# Patient Record
Sex: MALE | Race: WHITE | Employment: OTHER | URBAN - METROPOLITAN AREA
[De-identification: names, ages, dates, MRNs, and addresses within clinical notes are randomized per-mention and may not be internally consistent; named-entity substitution may affect disease eponyms.]

---

## 2018-01-01 ENCOUNTER — ANESTHESIA EVENT (OUTPATIENT)
Dept: SURGERY | Facility: HOSPITAL | Age: 78
End: 2018-01-01

## 2018-01-01 ENCOUNTER — APPOINTMENT (OUTPATIENT)
Dept: MRI IMAGING | Facility: HOSPITAL | Age: 78
DRG: 853 | End: 2018-01-01
Attending: Other
Payer: MEDICARE

## 2018-01-01 ENCOUNTER — APPOINTMENT (OUTPATIENT)
Dept: CT IMAGING | Facility: HOSPITAL | Age: 78
DRG: 853 | End: 2018-01-01
Attending: NURSE PRACTITIONER
Payer: MEDICARE

## 2018-01-01 ENCOUNTER — APPOINTMENT (OUTPATIENT)
Dept: GENERAL RADIOLOGY | Facility: HOSPITAL | Age: 78
DRG: 853 | End: 2018-01-01
Attending: EMERGENCY MEDICINE
Payer: MEDICARE

## 2018-01-01 ENCOUNTER — APPOINTMENT (OUTPATIENT)
Dept: GENERAL RADIOLOGY | Facility: HOSPITAL | Age: 78
DRG: 853 | End: 2018-01-01
Attending: UROLOGY
Payer: MEDICARE

## 2018-01-01 ENCOUNTER — APPOINTMENT (OUTPATIENT)
Dept: ULTRASOUND IMAGING | Facility: HOSPITAL | Age: 78
DRG: 853 | End: 2018-01-01
Attending: HOSPITALIST
Payer: MEDICARE

## 2018-01-01 ENCOUNTER — APPOINTMENT (OUTPATIENT)
Dept: CT IMAGING | Facility: HOSPITAL | Age: 78
DRG: 853 | End: 2018-01-01
Attending: EMERGENCY MEDICINE
Payer: MEDICARE

## 2018-01-01 ENCOUNTER — APPOINTMENT (OUTPATIENT)
Dept: ULTRASOUND IMAGING | Facility: HOSPITAL | Age: 78
DRG: 853 | End: 2018-01-01
Attending: INTERNAL MEDICINE
Payer: MEDICARE

## 2018-01-01 ENCOUNTER — ANESTHESIA (OUTPATIENT)
Dept: SURGERY | Facility: HOSPITAL | Age: 78
End: 2018-01-01

## 2018-01-01 ENCOUNTER — APPOINTMENT (OUTPATIENT)
Dept: CT IMAGING | Facility: HOSPITAL | Age: 78
DRG: 853 | End: 2018-01-01
Attending: INTERNAL MEDICINE
Payer: MEDICARE

## 2018-01-01 ENCOUNTER — APPOINTMENT (OUTPATIENT)
Dept: GENERAL RADIOLOGY | Facility: HOSPITAL | Age: 78
DRG: 853 | End: 2018-01-01
Attending: INTERNAL MEDICINE
Payer: MEDICARE

## 2018-01-01 ENCOUNTER — HOSPITAL ENCOUNTER (INPATIENT)
Facility: HOSPITAL | Age: 78
LOS: 5 days | DRG: 853 | End: 2018-01-01
Attending: EMERGENCY MEDICINE | Admitting: HOSPITALIST
Payer: MEDICARE

## 2018-01-01 ENCOUNTER — APPOINTMENT (OUTPATIENT)
Dept: CV DIAGNOSTICS | Facility: HOSPITAL | Age: 78
DRG: 853 | End: 2018-01-01
Attending: INTERNAL MEDICINE
Payer: MEDICARE

## 2018-01-01 VITALS
RESPIRATION RATE: 16 BRPM | WEIGHT: 202.63 LBS | HEART RATE: 86 BPM | OXYGEN SATURATION: 96 % | SYSTOLIC BLOOD PRESSURE: 87 MMHG | BODY MASS INDEX: 29.01 KG/M2 | HEIGHT: 70 IN | DIASTOLIC BLOOD PRESSURE: 56 MMHG | TEMPERATURE: 98 F

## 2018-01-01 DIAGNOSIS — I48.91 ATRIAL FIBRILLATION WITH RAPID VENTRICULAR RESPONSE (HCC): ICD-10-CM

## 2018-01-01 DIAGNOSIS — Z79.01 ANTICOAGULATED WITH WARFARIN: ICD-10-CM

## 2018-01-01 DIAGNOSIS — N30.00 ACUTE CYSTITIS WITHOUT HEMATURIA: Primary | ICD-10-CM

## 2018-01-01 DIAGNOSIS — R79.1 ELEVATED INR: ICD-10-CM

## 2018-01-01 DIAGNOSIS — R79.89 ELEVATED LACTIC ACID LEVEL: ICD-10-CM

## 2018-01-01 DIAGNOSIS — A41.9 SEPSIS, DUE TO UNSPECIFIED ORGANISM: ICD-10-CM

## 2018-01-01 PROCEDURE — 74230 X-RAY XM SWLNG FUNCJ C+: CPT | Performed by: UROLOGY

## 2018-01-01 PROCEDURE — 30283B1 TRANSFUSION OF NONAUTOLOGOUS 4-FACTOR PROTHROMBIN COMPLEX CONCENTRATE INTO VEIN, PERCUTANEOUS APPROACH: ICD-10-PCS | Performed by: EMERGENCY MEDICINE

## 2018-01-01 PROCEDURE — 74019 RADEX ABDOMEN 2 VIEWS: CPT | Performed by: INTERNAL MEDICINE

## 2018-01-01 PROCEDURE — 74176 CT ABD & PELVIS W/O CONTRAST: CPT | Performed by: INTERNAL MEDICINE

## 2018-01-01 PROCEDURE — 0T778DZ DILATION OF LEFT URETER WITH INTRALUMINAL DEVICE, VIA NATURAL OR ARTIFICIAL OPENING ENDOSCOPIC: ICD-10-PCS | Performed by: UROLOGY

## 2018-01-01 PROCEDURE — 76700 US EXAM ABDOM COMPLETE: CPT | Performed by: INTERNAL MEDICINE

## 2018-01-01 PROCEDURE — 93306 TTE W/DOPPLER COMPLETE: CPT | Performed by: INTERNAL MEDICINE

## 2018-01-01 PROCEDURE — 30233K1 TRANSFUSION OF NONAUTOLOGOUS FROZEN PLASMA INTO PERIPHERAL VEIN, PERCUTANEOUS APPROACH: ICD-10-PCS | Performed by: EMERGENCY MEDICINE

## 2018-01-01 PROCEDURE — 99232 SBSQ HOSP IP/OBS MODERATE 35: CPT | Performed by: HOSPITALIST

## 2018-01-01 PROCEDURE — 99233 SBSQ HOSP IP/OBS HIGH 50: CPT | Performed by: HOSPITALIST

## 2018-01-01 PROCEDURE — 99222 1ST HOSP IP/OBS MODERATE 55: CPT | Performed by: NURSE PRACTITIONER

## 2018-01-01 PROCEDURE — 74420 UROGRAPHY RTRGR +-KUB: CPT | Performed by: UROLOGY

## 2018-01-01 PROCEDURE — 99223 1ST HOSP IP/OBS HIGH 75: CPT | Performed by: HOSPITALIST

## 2018-01-01 PROCEDURE — 99232 SBSQ HOSP IP/OBS MODERATE 35: CPT | Performed by: INTERNAL MEDICINE

## 2018-01-01 PROCEDURE — 72146 MRI CHEST SPINE W/O DYE: CPT | Performed by: OTHER

## 2018-01-01 PROCEDURE — 99221 1ST HOSP IP/OBS SF/LOW 40: CPT | Performed by: INTERNAL MEDICINE

## 2018-01-01 PROCEDURE — 70450 CT HEAD/BRAIN W/O DYE: CPT | Performed by: EMERGENCY MEDICINE

## 2018-01-01 PROCEDURE — BT1F0ZZ FLUOROSCOPY OF LEFT KIDNEY, URETER AND BLADDER USING HIGH OSMOLAR CONTRAST: ICD-10-PCS | Performed by: UROLOGY

## 2018-01-01 PROCEDURE — 72148 MRI LUMBAR SPINE W/O DYE: CPT | Performed by: OTHER

## 2018-01-01 PROCEDURE — 99232 SBSQ HOSP IP/OBS MODERATE 35: CPT | Performed by: NURSE PRACTITIONER

## 2018-01-01 PROCEDURE — 71045 X-RAY EXAM CHEST 1 VIEW: CPT | Performed by: EMERGENCY MEDICINE

## 2018-01-01 PROCEDURE — 70450 CT HEAD/BRAIN W/O DYE: CPT | Performed by: NURSE PRACTITIONER

## 2018-01-01 DEVICE — STENT URET 6F 28CM ULSMTH: Type: IMPLANTABLE DEVICE | Site: URETER | Status: FUNCTIONAL

## 2018-01-01 RX ORDER — DEXTROSE AND SODIUM CHLORIDE 5; .9 G/100ML; G/100ML
INJECTION, SOLUTION INTRAVENOUS CONTINUOUS
Status: DISCONTINUED | OUTPATIENT
Start: 2018-01-01 | End: 2018-01-01

## 2018-01-01 RX ORDER — SODIUM CHLORIDE 9 MG/ML
INJECTION, SOLUTION INTRAVENOUS ONCE
Status: COMPLETED | OUTPATIENT
Start: 2018-01-01 | End: 2018-01-01

## 2018-01-01 RX ORDER — ASPIRIN 81 MG
1 TABLET, DELAYED RELEASE (ENTERIC COATED) ORAL NIGHTLY
COMMUNITY

## 2018-01-01 RX ORDER — LIDOCAINE HYDROCHLORIDE 20 MG/ML
10 JELLY TOPICAL ONCE
Status: COMPLETED | OUTPATIENT
Start: 2018-01-01 | End: 2018-01-01

## 2018-01-01 RX ORDER — SODIUM CHLORIDE 9 MG/ML
INJECTION, SOLUTION INTRAVENOUS ONCE
Status: DISCONTINUED | OUTPATIENT
Start: 2018-01-01 | End: 2018-01-01

## 2018-01-01 RX ORDER — LORAZEPAM 2 MG/ML
1 INJECTION INTRAMUSCULAR EVERY 10 MIN PRN
Status: DISCONTINUED | OUTPATIENT
Start: 2018-01-01 | End: 2018-01-01

## 2018-01-01 RX ORDER — LEVOTHYROXINE SODIUM 175 UG/1
175 TABLET ORAL
COMMUNITY

## 2018-01-01 RX ORDER — ATORVASTATIN CALCIUM 80 MG/1
80 TABLET, FILM COATED ORAL NIGHTLY
Status: DISCONTINUED | OUTPATIENT
Start: 2018-01-01 | End: 2018-01-01

## 2018-01-01 RX ORDER — WARFARIN SODIUM 3 MG/1
3 TABLET ORAL SEE ADMIN INSTRUCTIONS
COMMUNITY

## 2018-01-01 RX ORDER — ASPIRIN 81 MG/1
81 TABLET ORAL DAILY
Status: DISCONTINUED | OUTPATIENT
Start: 2018-01-01 | End: 2018-01-01

## 2018-01-01 RX ORDER — SODIUM CHLORIDE 9 MG/ML
INJECTION, SOLUTION INTRAVENOUS CONTINUOUS
Status: DISCONTINUED | OUTPATIENT
Start: 2018-01-01 | End: 2018-01-01

## 2018-01-01 RX ORDER — MORPHINE SULFATE 4 MG/ML
2 INJECTION, SOLUTION INTRAMUSCULAR; INTRAVENOUS EVERY 10 MIN PRN
Status: DISCONTINUED | OUTPATIENT
Start: 2018-01-01 | End: 2018-01-01

## 2018-01-01 RX ORDER — MORPHINE SULFATE 4 MG/ML
4 INJECTION, SOLUTION INTRAMUSCULAR; INTRAVENOUS EVERY 10 MIN PRN
Status: DISCONTINUED | OUTPATIENT
Start: 2018-01-01 | End: 2018-01-01

## 2018-01-01 RX ORDER — SODIUM CHLORIDE 450 MG/100ML
INJECTION, SOLUTION INTRAVENOUS CONTINUOUS
Status: DISCONTINUED | OUTPATIENT
Start: 2018-01-01 | End: 2018-01-01

## 2018-01-01 RX ORDER — LORAZEPAM 2 MG/ML
0.5 INJECTION INTRAMUSCULAR EVERY 10 MIN PRN
Status: DISCONTINUED | OUTPATIENT
Start: 2018-01-01 | End: 2018-01-01

## 2018-01-01 RX ORDER — ACETAMINOPHEN 500 MG
1000 TABLET ORAL ONCE
Status: COMPLETED | OUTPATIENT
Start: 2018-01-01 | End: 2018-01-01

## 2018-01-01 RX ORDER — MELATONIN
325 DAILY
Status: DISCONTINUED | OUTPATIENT
Start: 2018-01-01 | End: 2018-01-01

## 2018-01-01 RX ORDER — POTASSIUM CHLORIDE 14.9 MG/ML
20 INJECTION INTRAVENOUS ONCE
Status: COMPLETED | OUTPATIENT
Start: 2018-01-01 | End: 2018-01-01

## 2018-01-01 RX ORDER — MAGNESIUM SULFATE HEPTAHYDRATE 40 MG/ML
2 INJECTION, SOLUTION INTRAVENOUS ONCE
Status: COMPLETED | OUTPATIENT
Start: 2018-01-01 | End: 2018-01-01

## 2018-01-01 RX ORDER — SODIUM CHLORIDE 9 MG/ML
125 INJECTION, SOLUTION INTRAVENOUS CONTINUOUS
Status: DISCONTINUED | OUTPATIENT
Start: 2018-01-01 | End: 2018-01-01

## 2018-01-01 RX ORDER — DOCUSATE SODIUM 100 MG/1
100 CAPSULE, LIQUID FILLED ORAL 2 TIMES DAILY
Status: DISCONTINUED | OUTPATIENT
Start: 2018-01-01 | End: 2018-01-01

## 2018-01-01 RX ORDER — LEVETIRACETAM 250 MG/1
250 TABLET ORAL 2 TIMES DAILY
Status: DISCONTINUED | OUTPATIENT
Start: 2018-01-01 | End: 2018-01-01

## 2018-01-01 RX ORDER — GLYCOPYRROLATE 0.2 MG/ML
0.8 INJECTION, SOLUTION INTRAMUSCULAR; INTRAVENOUS EVERY 4 HOURS PRN
Status: DISCONTINUED | OUTPATIENT
Start: 2018-01-01 | End: 2018-01-01

## 2018-01-01 RX ORDER — POLYVINYL ALCOHOL 14 MG/ML
1 SOLUTION/ DROPS OPHTHALMIC 4 TIMES DAILY PRN
Status: DISCONTINUED | OUTPATIENT
Start: 2018-01-01 | End: 2018-01-01

## 2018-01-01 RX ORDER — METOPROLOL TARTRATE 5 MG/5ML
5 INJECTION INTRAVENOUS ONCE
Status: COMPLETED | OUTPATIENT
Start: 2018-01-01 | End: 2018-01-01

## 2018-01-01 RX ORDER — WARFARIN SODIUM 3 MG/1
4.5 TABLET ORAL SEE ADMIN INSTRUCTIONS
COMMUNITY

## 2018-01-01 RX ORDER — LIDOCAINE HYDROCHLORIDE 20 MG/ML
JELLY TOPICAL AS NEEDED
Status: DISCONTINUED | OUTPATIENT
Start: 2018-01-01 | End: 2018-01-01 | Stop reason: HOSPADM

## 2018-01-01 RX ORDER — DEXTROSE MONOHYDRATE 25 G/50ML
INJECTION, SOLUTION INTRAVENOUS
Status: COMPLETED
Start: 2018-01-01 | End: 2018-01-01

## 2018-10-10 PROBLEM — I48.91 ATRIAL FIBRILLATION WITH RAPID VENTRICULAR RESPONSE (HCC): Status: ACTIVE | Noted: 2018-01-01

## 2018-10-10 PROBLEM — E87.2 METABOLIC ACIDOSIS: Status: ACTIVE | Noted: 2018-01-01

## 2018-10-10 PROBLEM — R79.89 AZOTEMIA: Status: ACTIVE | Noted: 2018-01-01

## 2018-10-10 PROBLEM — N30.00 ACUTE CYSTITIS WITHOUT HEMATURIA: Status: ACTIVE | Noted: 2018-01-01

## 2018-10-10 PROBLEM — Z79.01 ANTICOAGULATED WITH WARFARIN: Status: ACTIVE | Noted: 2018-01-01

## 2018-10-10 PROBLEM — A41.9 SEPSIS, DUE TO UNSPECIFIED ORGANISM: Status: ACTIVE | Noted: 2018-01-01

## 2018-10-10 PROBLEM — R79.89 ELEVATED LACTIC ACID LEVEL: Status: ACTIVE | Noted: 2018-01-01

## 2018-10-10 PROBLEM — R79.1 ELEVATED INR: Status: ACTIVE | Noted: 2018-01-01

## 2018-10-10 NOTE — PROGRESS NOTES
Notified by patient's RN that patient's head CT was completed and showed the following results:    CONCLUSION:       1.   There are new multi focal regions of small extra-axial pockets of hemorrhage that measure up to 5 mm in thickness surrounding the high

## 2018-10-10 NOTE — CONSULTS
[address] Cardiology Consultation    Corinazainab Andersen Patient Status:  Emergency    1940 MRN [id number]   Location [address] Attending Natty Krishnamurthy MD   [address] Day # 0 PCP PHYSICIAN NONSTAFF     Reason for Consultation:  Mariana Holguin Peripheral vascular disease (HCC)    • Pneumonia, organism unspecified(486)    • Renal disorder     ckd stage 2   • Seizure disorder (HCC)    • Stroke [facility])    • Unspecified atrial fibrillation (HCC)    • Unspecified essential hypertension    • Unspecified Extremities: No edema  Neurologic: no focal deficits  Skin: Warm and dry. Telemetry: Afib    Laboratories and Data:  Diagnostics:    EKG, 10/10/2018: Afib, tachy.  RBBB    CXR, 10/10/2018:  reviewd    Labs:   HEM:  Recent Labs   Lab  10/10/18

## 2018-10-10 NOTE — CONSULTS
[facility]  Neurosurgery Consult    [address] Patient Status:  Inpatient    1940 MRN [id number]   [facility] 4SW-A Attending Ramon Medellin MD   [facility] Day # 0 PCP PHYSICIAN NONSTAFF     REASON FOR CONSULTATION:  Duy Quigley healing    • Other and unspecified hyperlipidemia    • Peripheral vascular disease ([facility]    • Pneumonia, organism unspecified(486)    • Renal disorder     ckd stage 2   • Seizure disorder (HCC)    • Stroke [facility])    • Unspecified atrial fibrillation ([facility] Oral Cap Take 100 mg by mouth 2 (two) times daily. Disp: 60 capsule Rfl: 0 10/9/2018 at Unknown time   levETIRAcetam 250 MG Oral Tab Take 250 mg by mouth 2 (two) times daily.  Disp:  Rfl:  10/9/2018 at Unknown time   Ferrous Sulfate 325 (65 FE) MG Oral Tab and orientated x 3. Speech fluent. Comprehension intact. PERRLA +2 brisk,  EOMI. Face is symmetrical. CN's GI. Sensation to light touch is intact bilaterally. Motor: No Drift. RUE, RLE PF/DF 5/5, LUE 4+/5.  LLE PF/DF 5/5, iliopsoas/quads 4/5 bilateral loss are again noted.     ASSESSMENT:  This is an [de-identified]year old male w/ extra-axial intracranial hemorrhage  Coagulopathy  Urosepsis  Transaminitis    Plan:  No surgical intervention at this time  Hold all anticoagulation until cleared by neurosurgery  [facility]

## 2018-10-10 NOTE — ED PROVIDER NOTES
Patient Seen in: [facility] Emergency Department    History   Patient presents with:  Rapid Heart Beat    Stated Complaint: rapid hr, fever, uti    HPI    Patient presents to the emergency department.   He is brought in by EMS for UTI as directed by blood pressure    • High cholesterol    • History of blood transfusion    • HYPERTHYROIDISM    • Long term (current) use of anticoagulants    • Muscle weakness    • Neuropathy    • Nondisplaced oblique fracture of shaft of humerus, right arm, subsequent en 103   Temp 98.3 °F (36.8 °C) (Temporal)   Resp 22   Ht 177.8 cm (5' 10\")   Wt 91.9 kg   SpO2 98%   BMI 29.07 kg/m²         Physical Exam    Chronically ill-appearing [de-identified]year-old lying on an emergency department bed he has very dry mucosa and poor dentitio components:    Pro-Beta Natriuretic Peptide 11,563 (*)     All other components within normal limits   PROTHROMBIN TIME (PT) - Abnormal; Notable for the following components:    PT 94.0 (*)     INR 11.88 (*)     All other components within normal limits Notable for the following components:    PT 17.3 (*)     INR 1.36 (*)     All other components within normal limits   BLOOD CULTURE - Abnormal; Notable for the following components:    Blood Culture Smear Gram Negative Rods (*)     Blood Culture Smear Ente following orders were created for panel order CBC WITH DIFFERENTIAL WITH PLATELET.   Procedure                               Abnormality         Status                     ---------                               -----------         ------ Impression     CONCLUSION:  Mild asymmetric increased opacity of the right chest may represent developing pneumonia.  Followup is recommended to ensure resolution.              Dictated by: Abhishek Galicia MD on 10/10/2018 at 10:34       Approved by: Aneesh Balderrama acid level    Disposition:  Admit  10/10/2018 10:38 am    Follow-up:  No follow-up provider specified.       Medications Prescribed:  Current Discharge Medication List        Present on Admission  Date Reviewed: 9/15/2016          ICD-10-CM Noted POA    * (

## 2018-10-10 NOTE — CONSULTS
Neurology consult NOTE    The reason for consultation:  Legs weakness.    HPI:   Robert Lanza is a [de-identified]year old male with medical history of paroxysmal atrial fibrillation, hypothyroidism, seizure disorder, essential hypertension, and coronary artery d Performed by Mati Downs MD at [facility] CVOR   • CABG     • CABG, ARTERIAL, SINGLE     • FRACTURE SURGERY  December 1, 2016    hip, left   • HIP PINNING Left 12/1/2016    Performed by June Rolle MD at [facility] MAIN OR   • OTHER      hip fracture surgery   • O Rfl:    aspirin 81 MG Oral Tab EC Take 1 tablet (81 mg total) by mouth daily. Disp: 30 tablet Rfl: 6   docusate sodium 100 MG Oral Cap Take 100 mg by mouth 2 (two) times daily.  Disp: 60 capsule Rfl: 0   levETIRAcetam 250 MG Oral Tab Take 250 mg by mouth 2 to light bilaterally.       EOM/lids NL     Facial sensation/Corneal: NL     Face (motor) NL     Hearing: NL     Palate: NL     SCM/Trapezius: 5/5     Tongue in the middle    MOTOR FUNCTION:     Tone: NL     Bulk: NL     Strength: upper 5/5, lower proximal thickness surrounding the high convexity right cerebral hemisphere mostly along the right frontal lobe. These are not causing significant mass effect. 2.  Greater than age appropriate parenchymal volume loss is again noted.   3.  Numerous chronic infarcts weakness can be due to the deconditioning. PLAN:     T- and L-spine MRI w/o contrast  Check CPK, ESR, CRp, TSH. Thank you for letting me participate in  care. If you have further questions, please feel free to contact me.     Sincerely,      MIRI

## 2018-10-10 NOTE — H&P
[facility]  History and Physical     Javiameya Mack Patient Status:  Inpatient    1940 MRN [id number]   [facility] 4SW-A Attending Devon Rai MD   Hosp Day # 0 PCP PHYSICIAN NONSTAFF     Chief Complaint: sepsis    Hi • Nondisplaced oblique fracture of shaft of humerus, right arm, subsequent encounter for fracture with routine healing    • Other and unspecified hyperlipidemia    • Peripheral vascular disease ([facility]    • Pneumonia, organism unspecified(486)    • Renal d mouth daily. Disp: 30 tablet Rfl: 6   docusate sodium 100 MG Oral Cap Take 100 mg by mouth 2 (two) times daily. Disp: 60 capsule Rfl: 0   levETIRAcetam 250 MG Oral Tab Take 250 mg by mouth 2 (two) times daily.  Disp:  Rfl:    Ferrous Sulfate 325 (65 FE) MG Labs   Lab  10/10/18   0934   GLU  118*   BUN  45*   CREATSERUM  2.11*   GFRAA  33*   GFRNAA  29*   CA  9.0   ALB  2.2*   NA  138   K  3.6   CL  105   CO2  19.0*   ALKPHO  394*   AST  600*   ALT  395*   BILT  1.6   TP  7.9       Estimated Creatinine Cleara

## 2018-10-10 NOTE — PLAN OF CARE
Received pt from ER nurse alert and oriented on 4L NC with sepsis bolus infusing. Pt has Afib RVR; on cardizem drip. Seen by pulmonary, critical care and APN. Hospitalist paged.

## 2018-10-10 NOTE — PROGRESS NOTES
[facility]      Sepsis Reassessment Note    /76   Pulse (!) 127   Temp 98.5 °F (36.9 °C)   Resp (!) 28   Ht 182.9 cm (6')   Wt 196 lb 3.4 oz (89 kg)   SpO2 96%   BMI 26.61 kg/m²      12:01 PM    Cardiac:  Regularity: Irregular  Rate: Tachycard

## 2018-10-10 NOTE — CONSULTS
Pulmonary / Critical Care H&P/Consult       NAME: Yessi Ybarra - ROOM: B3/B3 - MRN: [id number] - Age: [de-identified]year old - :  1940    Date of Admission: 10/10/2018  9:22 AM  Admission Diagnosis: No admission diagnoses are documented for this encounte atrial fibrillation ([facility]    • Unspecified essential hypertension    • Unspecified hypothyroidism       Past Surgical History:   Procedure Laterality Date   • ABDOMINAL AORTIC ANEURYSM ENDOSCOPIC N/A 11/17/2015    Performed by Lamont Silveira MD at [address] [place] [address] CVO Thursday, Friday, Saturday, Sunday. Disp:  Rfl:    Multiple Vitamins-Minerals (THERA-M) Oral Tab Take 1 tablet by mouth nightly.  Disp:  Rfl:    Levothyroxine Sodium 175 MCG Oral Tab Take 175 mcg by mouth before breakfast. Disp:  Rfl:    metoprolol Tartrate kg/m²     General Appearance:    Alert, cooperative, no distress, appears stated age   Head:    Normocephalic, without obvious abnormality, atraumatic   Eyes:    PERRL, conjunctiva/corneas clear      Neck:   Supple, symmetrical, trachea midline, no adenopa sepsis and dehydration  - abd us as above  - follow; gi consult if not improving.    4. Weakness: pt reports inability to ambulate x 3 weeks; no focal deficits on exam  - ct head pending  - consider spine imaging and / or neuro eval if not improved with tx

## 2018-10-11 NOTE — PROGRESS NOTES
[facility] Urology addendum    Patient seen with family at the bedside. Imaging and labs personally reviewed. We discussed the operative plan for cystoscopy, left retrograde pyelogram, left ureteral stent placement.     We discussed risks including but not limit

## 2018-10-11 NOTE — PROGRESS NOTES
[address] [facility] Cardiology Progress Note        Qianjamel Carlee Patient Status:  Inpatient    1940 MRN [id number]   [facility] 4SW-A Attending Hamzah Tinsley MD   Hosp Day # 1 PCP PHYSICIAN NONSTAFF     Subjective: Chem:  Recent Labs   Lab  10/10/18   0934  10/11/18   0441   NA  138  145*   K  3.6  3.7  3.7   CL  105  116*   CO2  19.0*  17.0*   BUN  45*  57*   CREATSERUM  2.11*  2.25*   CA  9.0  7.8*   MG  1.8  2.2   GLU  118*  98       Recent Labs   Lab  10/10

## 2018-10-11 NOTE — OCCUPATIONAL THERAPY NOTE
Received ADL screen orders per protocol upon admit. Patient is being followed by neurosurgery for ICH. Will await orders for PT/OT eval and treat w/ activity orders from neurosurgery.

## 2018-10-11 NOTE — PROGRESS NOTES
[facility]  Neurosurgery Progress Note    Tha Ogden Patient Status:  Inpatient    1940 MRN [id number]   [facility] 4SW-A Attending Alban Angel MD   Hosp Day # 1 PCP PHYSICIAN NONSTAFF     Subjective:  Pt examined, No developing acute sinusitis or mastoiditis.     =====  CONCLUSION:  Stable appearance since yesterday.      MRI T/L     =====  CONCLUSION:       1.  Multilevel degenerative changes in the thoracic spine and lumbar spine as above without significant spin

## 2018-10-11 NOTE — PROGRESS NOTES
[facility]  Progress Note     Smiley Miles Patient Status:  Inpatient    1940 MRN [id number]   [facility] 4SW-A Attending Jesika Godinez MD   Hosp Day # 1 PCP PHYSICIAN NONSTAFF     Chief Complaint: sepsis    S: Patient 121       Lab Results   Component Value Date    TSH 1.320 10/10/2018       Imaging: Imaging data reviewed in Epic.     Medications:   • meropenem  500 mg Intravenous Q12H   • atorvastatin  80 mg Oral Nightly   • docusate sodium  100 mg Oral BID   • ferrous

## 2018-10-11 NOTE — PLAN OF CARE
Assumed care at [address]. Alert. Seizure precautions. See flowsheet for further assessment. MRI of spine completed -- APN aware of results. Repeat CT brain this AM.  Received on 3L NC. Weaning O2 as tolerated. A fib. Cardizem drip. NPO.   Speech consul

## 2018-10-11 NOTE — CONSULTS
INFECTIOUS DISEASE CONSULT NOTE    Yordy Maher Patient Status:  Inpatient    1940 MRN [id number]   [facility] 4SW-A Attending Tyesha Duggan MD   Hosp Day # 1 PCP [facility] (current) use of anticoagulants    • Muscle weakness    • Neuropathy    • Nondisplaced oblique fracture of shaft of humerus, right arm, subsequent encounter for fracture with routine healing    • Other and unspecified hyperlipidemia    • Peripheral vascula (SYNTHROID, LEVOTHROID) tab 175 mcg, 175 mcg, Oral, Before breakfast  •  levETIRAcetam (KEPPRA) 250 mg in sodium chloride 0.9% 100 mL IVPB, 250 mg, Intravenous, Q12H  •  influenza virus vaccine (FLUAD) ages 72 years and older inj 0.5ml, 0.5 mL, Intramuscul Radiology: Ct Brain Or Head ([id number])    Result Date: 10/11/2018  PROCEDURE:  CT BRAIN OR HEAD (35944)  COMPARISON:  [facility] CT BRAIN OR HEAD (61073), 10/10/2018, 14:11.   INDICATIONS:  intracranial hemorrhage  TECHNIQUE:  Noncontrast CT scanning is perf right cerebral hemisphere along its high convexity is noted. Focal region of extra-axial hemorrhage adjacent to the right frontal lobe measures approximately 5 mm in thickness.   There is marked prominence of the subarachnoid space and sulci that is stable pericholecystic fluid. Discrete stones are not identified. Differential considerations include acalculous cholecystitis, sludge because of nonfasting state or hyperalimentation and sludge filled gallbladder with a small amount of adjacent ascites.   Corre curvature. There is mildly exaggerated thoracic kyphosis with anatomic alignment. Vertebral body heights are well-maintained.   Desiccation and disc height loss throughout the thoracic spine with moderate endplate spurring and degenerative endplate marrow or fluid collection is seen in the lumbar spinal canal.   Small amount of ascites in the upper abdomen. Partially imaged left hydronephrosis. Nonspecific patchy soft tissue edema in the posterior paraspinal musculature could be due to chronic strain.    Kishor Court the roots of the cauda equina. Differential considerations would include nonspecific arachnoiditis, changes of idiopathic demyelinating polyneuropathy, with other etiologies not entirely excluded. Clinical correlation recommended.   6. Partially imaged sm evidence of focal hepatic mass. There is mild ascites surrounding the liver and tracking along the pericolic gutters bilaterally  BILIARY:  The gallbladder is distended. There is cholelithiasis and sludge in the gallbladder.   There is concern for gallbla reactive edematous changes. There is aorto bi-iliac endo graft with stable abdominal aortic native aneurysm. Fluid levels and mildly prominent small bowel loops in the left upper quadrant and central abdomen without discrete transition point.   Sherill Delay narrow abx down as needed  -follow new Bcx to doc clearance  -await NISHANT moreland in view of hydronephrosis  -follow LFTs and abd exam  Case and plan d/w staff  Thank you for allowing me to participate in the care of this patient.  Please do not hesitate to call

## 2018-10-11 NOTE — PLAN OF CARE
Upon assessment pt resting in bed on nasal cannula with Cardizem drip infusing. Pt AOx 4 and denies pain. Daughter updated via phone. VSS, will continue to monitor. Pt taken for CT of abd/pelvis, following results urology consulted.  Pt to have stent placed

## 2018-10-11 NOTE — PLAN OF CARE
US abd done at bedside. CT head completed. Critical results called to RN, [facility] APN notified. Consult to neuro surg and neuro called. See consult note for details. Repeat CT head tomorrow am. MRI spine ordered by neuro. Type and screen done, 2 ffp transfused.

## 2018-10-11 NOTE — PROGRESS NOTES
10/11/18 2304   Clinical Encounter Type   Visited With Family  (Patient's daughter Kristen Machuca outside of patient's room)   Routine Visit (Responded to request for consult - A/D)   Pt. was being prepared for surgery at time of visit.  ANNI Morales had received a

## 2018-10-11 NOTE — ANESTHESIA PREPROCEDURE EVALUATION
PRE-OP EVALUATION    Patient Name: Smiley Miles    Pre-op Diagnosis: UTI (urinary tract infection) [N39.0]    Procedure(s):  CYSTOSCOPY, LEFT RETROGRADE, PYELOGRAM, INSERTION LEFT URETERAL STENT    Surgeon(s) and Role:     Cherylle Kawasaki, DO - [COMPLETED] phytonadione (AQUA-MEPHYTON) 10 mg in sodium chloride 0.9% 50 mL IVPB 10 mg Intravenous Once   [COMPLETED] Prothrombin Complex Conc Human (KCENTRA) 4,336 Units in 160 mL infusion 4,336 Units Intravenous Once   levETIRAcetam (KEPPRA) 250 mg in s (+) dysrhythmias and atrial fibrillation                  Endo/Other           (+) hypothyroidism                       Pulmonary    Negative pulmonary ROS.                        Neuro/Psych          (+) CVA and residual deficits    (+) seizures (+) decreased breath sounds         Other findings            ASA: 4 and emergent  Plan: general           Comment: I explained intrinsic risks of general anesthesia, including nausea, dental damage, sore throat, mouth injury,and hoarseness from airway man

## 2018-10-11 NOTE — SLP NOTE
ADULT SWALLOWING EVALUATION    ASSESSMENT    ASSESSMENT/OVERALL IMPRESSION:  Pt was referred for a VFSS to assess swallow, r/o aspiration risk and allow for a safe diet.   Pt was adm with the following dxs and medical conditions:    Chief Complaint: sepsis Moderate    RECOMMENDATIONS   Diet Recommendations - Solids: NPO  Diet Recommendations - Liquid: NPO                              Medication Administration Recommendations: Crushed in puree  Treatment Plan/Recommendations: Videofluoroscopic swallow study • Unspecified hypothyroidism        Prior Living Situation: Home with spouse  Diet Prior to Admission: Unknown  Precautions: Aspiration    Patient/Family Goals:  To initiate a LRD for pt w/o s/s of aspiration risk    SWALLOWING HISTORY  Current Diet Consi HEAD ([id number]) (Order #[id number]) on 10/11/2018 - Order Result History Report                                                                                    SUBJECTIVE       OBJECTIVE   ORAL MOTOR EXAMINATION  Dentition: (Missing upper teeth / ill/crooke

## 2018-10-11 NOTE — PROGRESS NOTES
Critical Care Progress Note     Assessment / Plan:  1. Severe sepsis - secondary to UTI and possible pneumonia  - lactic acid is now normal  - abx as below  2. ID - UTI and ? pneumonia  - zosyn  - await cx and adjust as needed  3.  ICH - stable on CT brain

## 2018-10-11 NOTE — CONSULTS
[facility] Urology   Consultation Note    Jad Marco Patient Status:  Inpatient    1940 MRN [id number]   [facility] 4SW-A Attending Helio Taylor MD   Hosp Day # 1 PCP PHYSICIAN NONSTAFF     Reason for Consultation: cholecystitis should be clinically excluded. Small amount of abdominal and pelvic ascites. There is some wall thickening involving distal descending and sigmoid colon in the left lower quadrant and pelvis adjacent to areas of ascites.   Differential CYSTOSCOPY,INSERT URETERAL STENT     • FRACTURE SURGERY  December 1, 2016    hip, left   • HIP PINNING Left 12/1/2016    Performed by Yen Grady MD at [facility] MAIN OR   • OTHER      hip fracture surgery   • OTHER SURGICAL HISTORY  7/19/11    cysto voiding t draining yellow urine. SKIN: Without stigmata. EXTREMITIES: Without edema.       Laboratory Data:  Lab Results   Component Value Date    WBC 11.4 10/11/2018    HGB 10.0 10/11/2018    HCT 32.6 10/11/2018    .0 10/11/2018    CREATSERUM 2.25 10/11/20 dilatation. Correlate clinically for nonfasting state, hyperalimentation, etc.  Acalculous cholecystitis is also in the differential diagnosis since there is a   small amount of pericholecystic fluid. This may also represent ascites.   Small ascites is vi with more normal caliber small bowel distally. BONES:  Demineralization of the thoracic and lumbar spine   PELVIC ORGANS:  Espino catheter in a decompressed urinary bladder. Small amount of ascites in the pelvis.   Bilateral fat containing inguinal hernias Closed nondisplaced fracture of lateral epicondyle of right humerus     Fracture of humerus, lateral condyle, closed, right, with routine healing, subsequent encounter     Hip fracture, left (HCC)               Global exp 3-1-17 / LEFT HIP / BAM      Hip often left in place afterwards to alleviate those symptoms. Also, the stent allows easy access back into the kidney should there be residual stones to treat.  The stent must be removed within 3 months or otherwise risk that the stent may become encrusted ma

## 2018-10-12 PROBLEM — Z71.89 COUNSELING REGARDING ADVANCED CARE PLANNING AND GOALS OF CARE: Status: ACTIVE | Noted: 2018-01-01

## 2018-10-12 NOTE — PROGRESS NOTES
[facility]  Progress Note     Axel Ni Patient Status:  Inpatient    1940 MRN [id number]   [facility] 4SW-A Attending Shayy Pagan MD   Hosp Day # 2 PCP PHYSICIAN NONSTAFF     Chief Complaint: sepsis    S: Patient r 0441  10/12/18   0431   PTP  16.8*  17.3*  18.2*   INR  1.31*  1.36*  1.45*       Recent Labs   Lab  10/10/18   0934  10/10/18   1961   TROP  0.099*   --    CK   --   121            Imaging: Imaging data reviewed in Epic.     Medications:   • meropenem  500 refused  Obstructive series no  Palliative care evaluation   Pt considering hospice- was on prior     Cecilio Bell MD

## 2018-10-12 NOTE — CONSULTS
[address] [facility] Patient Status:  Inpatient    1940 MRN [id number]   [facility] 4SW-A Attending Devorah Baig MD   [address] Day # 2 PCP PHYSICIAN NONSTAFF     Date of Consult: 10/12/2 answer one word at a time. No c/o dypsnea. No cough or lightheadedness/dizziness. No current pain issues.    Per RN, he failed his VFSS and is currently NPO  She tried her best to talk with Tremayne Gonzalez about why he is in the hospital. He is not able to el comfortable. If the family does agree to all this then I encouraged them to let the RN know so she may place a SW consult for hospice services to meet with them and discuss the details of what they offer at home.     Medical History:  Past Medical Histor 12/1/2016    Performed by Sidra Serrano MD at [facility] MAIN OR   • OTHER      hip fracture surgery   • OTHER SURGICAL HISTORY  7/19/11    cysto voiding trial   • SPECIAL SERVICE OR REPORT      carotid endarterectomy   • TOTAL HIP REPLACEMENT         Palliative Extensive Disease    Total Care      20   Bed Bound       Can't do any work      Max Assist        Minimal                      Drowsy/confused                                  Extensive Disease    Total Care      10  Bed Bound        Can't do any ALKPHO 193 (H) 10/12/2018    BILT 0.9 10/12/2018    TP 7.0 10/12/2018     (H) 10/12/2018     (H) 10/12/2018    PSA 3.0 04/13/2015    MG 2.3 10/12/2018    PHOS 2.3 (L) 07/09/2011    TROP 0.099 (HH) 10/10/2018       Imaging:  Ct Brain Or Hea Approved by: Carolynn Soni MD            Ct Abdomen+pelvis Kidneystone 2d Rndr(no Iv,no Oral)(cpt=74176)    Result Date: 10/11/2018  CONCLUSION:  6 mm obstructing calculus in the proximal left ureter with moderate hydronephrosis.   Distended gallbladder w dentition  Neurologic: Alert and awake and able to answer with one word answers, pleasant, normal affect. Psychiatric: Mood does not appear sad or anxious  Skin: Warm and dry.     Palliative Performance Scale : 30%    Palliative Care Goals of Care:  Discus will fulfill the goal of avoiding tests. He also wants to be comfortable when at home and I encouraged him to focus on these things so hospice can help him stay home and be as comfortable as possible.     Adeline Dillard and family will come later tonight to visit him

## 2018-10-12 NOTE — PLAN OF CARE
Assumed care at [address] [place] [address]. Received patient from OR, post ureteral stent placement. Drowsy but easily arousable. More awake & alert thru shift. See flowsheet for further assessment. Received on 5L NC. Weaning O2 as tolerated. A fib. Cardizem drip.   VSS

## 2018-10-12 NOTE — PROGRESS NOTES
Neurology follow up NOTE    SUBJECTIVE:  No changes from yesterday.  Family members are all at bed side     OBJECTIVE:   /55   Pulse 90   Temp 98.4 °F (36.9 °C) (Temporal)   Resp 16   Ht 5' 10\" (1.778 m)   Wt 202 lb 9.6 oz (91.9 kg)   SpO2 98%   BMI appearance since yesterday. Dictated by: Alec Brandon MD on 10/11/2018 at 7:26     Approved by: Alec Brandon MD            Ct Brain Or Head ([id number])    Result Date: 10/10/2018  CONCLUSION:   1.   There are new multi focal regions of small extra-axia neural foraminal stenosis at L2-3 and L4-5. Mild to moderate right and mild left neural foraminal stenosis at L5-S1.  4.  Facet arthropathy throughout the lumbar spine. 5. There is mild nonspecific thickening of the roots of the cauda equina.   Lorenzo Fernandez recommended.    Dictated by: Westly Meckel, MD on 10/11/2018 at 12:06     Approved by: Westly Meckel, MD            Xr Chest Ap Portable  (cpt=71045)    Result Date: 10/10/2018  CONCLUSION:  Mild asymmetric increased opacity of the right chest may represent

## 2018-10-12 NOTE — CM/SW NOTE
Responding to order for advanced directives, hospice, discharge planning and POLST. Called to pt's daughter Latoya Gil ([phone]) who confirms she is her father's HCPOA. Introduced myself and role of CM in ICU.  Provided my contact information as

## 2018-10-12 NOTE — SLP NOTE
Pt originally scheduled for VFSS today. Per RN report and MD note, pt to remain strict NPO at this time pending GI evaluation. SLP to re-attempt 10/13/18 as pt's status allows.    Hira Alex M.S., JOHN-SLP/CHARLES

## 2018-10-12 NOTE — PLAN OF CARE
Impaired Activities of Daily Living    • Achieve highest/safest level of independence in self care Not Progressing        Impaired Functional Mobility    • Achieve highest/safest level of mobility/gait Not Progressing        Impaired Swallowing    • Minimi

## 2018-10-12 NOTE — BRIEF OP NOTE
Pre-Operative Diagnosis: PROXIMAL LEFT URETERAL CALCULUS, UROSEPSIS     Post-Operative Diagnosis: PROXIMAL LEFT URETERAL CALCULUS, UROSEPSIS     Procedure Performed:   Procedure(s):  CYSTOSCOPY, LEFT RETROGRADE, PYELOGRAM, INSERTION LEFT URETERAL STENT

## 2018-10-12 NOTE — ANESTHESIA POSTPROCEDURE EVALUATION
[facility] Patient Status:  Inpatient   Age/Gender [de-identified]year old male MRN [id number]   [facility] 4SW-A Attending Agustin Duggan MD   Hosp Day # 1 PCP PHYSICIAN NONSTAFF       Anesthesia Post-op Note    Procedure(s):

## 2018-10-12 NOTE — PROGRESS NOTES
[address] [facility]  TEL: [phone]  FAX: [phone]    Erich Colón Patient Status:  Inpatient    1940 MRN [id number]   [facility] 4SW-A Attending Bj Best MD   Hosp Day # 2 PCP PHYSICIAN NO with hydronephrosis   -also with abnl GB per imaging but no RUQ abd pain. Seen by GI, HIDA pend     2. GNR pyelonephritis     3. Obstructive stone with hydronephrosis.  S/p cysto/ stenting     4. abnl LFTs- assume due to sepsis, trending down, GI following

## 2018-10-12 NOTE — CONSULTS
Gastroenterology Initial Consultation    Leydi Mack Patient Status:  Inpatient    1940 MRN [id number]   [facility] 4SW-A Attending Cr Johansen MD   Hosp Day # 2 PCP PHYSICIAN NONSTAFF       Reason for Consultation/Chief Peripheral vascular disease ([facility] [address]    • Pneumonia, organism unspecified(486)    • Problems with swallowing    • Renal disorder     ckd stage 2   • Seizure disorder (HCC)    • Stroke [facility])    • Unspecified atrial fibrillation (HCC)    • Unspecified essential Intramuscular, Prior to discharge    Review of Systems:    Except for what is noted on the HPI the remainder 10 point review of systems is negative.     Gastrointestinal: Except for what is noted on the HPI the remainder 10 point review of systems is negati normal breath sounds on bilateral auscultation  Cardiovascular: Normal carotid artery pulses bilaterally, no lower extremity edema  Gastrointestinal: Soft, tender lower abdomen- no right upper quadrant pain, non-guarded on palpation.  No mass or hernia palp central abdomen without discrete transition point. Differential includes ileus versus gastroenteritis. Developing partial small bowel obstruction cannot entirely be   excluded. Followup recommended.          Assessment/Plan:    Elevated Liver Enzymes/Dis

## 2018-10-12 NOTE — PROGRESS NOTES
[facility]  Urology Progress Note    Jade Chirinos Patient Status:  Inpatient    1940 MRN [id number]   [facility] 4SW-A Attending Sampson Li MD   Hosp Day # 2 PCP PHYSICIAN NONSTAFF     Subjective:   Jade Chirinos is

## 2018-10-12 NOTE — PROGRESS NOTES
[facility] [address] [place] [address] Cardiology Progress Note        Sydney Cheek Patient Status:  Inpatient    1940 MRN [id number]   [facility] 4SW-A Attending Ramon Medellin MD   Hosp Day # 2 PCP PHYSICIAN NONSTAFF     Subjective: WBC  9.7  11.4  9.9   HGB  14.3  10.0*  10.8*   PLT  316.0  170.0  144.0*       Chem:  Recent Labs   Lab  10/10/18   0934  10/11/18   0441  10/12/18   0431   NA  138  145*  144   K  3.6  3.7  3.7  3.6   CL  105  116*  115*   CO2  19.0*  17.0*  17.0*   BU

## 2018-10-12 NOTE — PLAN OF CARE
Patient scheduled to have HIDA scan done at 1430, patient refusing scan, saying he \"does not want any more tests done. \" GI updated and okay with holding off on HIDA scan. Palliative care consult placed for goals of care. Daughter, POA updated.

## 2018-10-13 NOTE — PLAN OF CARE
Contact precautions initiated (+ mrsa nares on 10/11/18)     Seizure precautions initiated (bed rails padded, suction set-up,  initiated)  High fall risk precautions initiated (sign placed above bed & wrist band applied)  Aspiration precautions initiate does not want is a feeding tube.       Problem: Patient/Family Goals  Goal: Patient/Family Long Term Goal  Patient's Long Term Goal: \"make decision about hospice\"  Interventions:  - See additional Care Plan goals for specific interventions  Outcome: Progr need for suctioning and perform as needed  - Assess and instruct to report SOB or any respiratory difficulty  - Respiratory Therapy support as indicated  - Manage/alleviate anxiety  - Monitor for signs/symptoms of CO2 retention  Outcome: Progressing    Pro redness and/or skin breakdown  - Initiate interventions, skin care algorithm/standards of care as needed  Outcome: Progressing    Problem: MUSCULOSKELETAL - ADULT  Goal: Return mobility to safest level of function  INTERVENTIONS:  - Assess patient stabilit Educate and engage patient/family in tolerated activity level and precautions  Outcome: Not Progressing    Problem: Impaired Activities of Daily Living  Goal: Achieve highest/safest level of independence in self care  Interventions:  - Assess ability and e Pt/Family communicate acceptance of impending death and feel psychological comfort and peace  INTERVENTIONS:  - Assess patient/family anxiety and grief process related to end of life issues  - Provide emotional and spiritual support  - Provide information

## 2018-10-13 NOTE — PROGRESS NOTES
[facility]  Urology Progress Note    Nick Perez Patient Status:  Inpatient    1940 MRN [id number]   [facility] 8NE-A Attending Charlene Zee MD   Hosp Day # 3 PCP PHYSICIAN NONSTAFF     Assessment: 6 mm Left ureteral dede

## 2018-10-13 NOTE — PROGRESS NOTES
[facility]  Progress Note     Robert Lanza Patient Status:  Inpatient    1940 MRN [id number]   [facility] 4SW-A Attending Cecilio Bell MD   Hosp Day # 3 PCP PHYSICIAN NONSTAFF     Chief Complaint: sepsis    S: Patient c 1.36*  1.45*  1.72*       Recent Labs   Lab  10/10/18   0934  10/10/18   1959   TROP  0.099*   --    CK   --   121            Imaging: Imaging data reviewed in Epic.     Medications:   • meropenem  500 mg Intravenous Q12H   • atorvastatin  80 mg Oral Nightl

## 2018-10-13 NOTE — PLAN OF CARE
Problem: CARDIOVASCULAR - ADULT  Goal: Maintains optimal cardiac output and hemodynamic stability  INTERVENTIONS:  - Monitor vital signs, rhythm, and trends  - Monitor for bleeding, hypotension and signs of decreased cardiac output  - Evaluate effectivenes Follow urinary retention protocol/standard of care  - Consider collaborating with pharmacy to review patient's medication profile  - Implement strategies to promote bladder emptying  Outcome: Progressing      Comments: Pt in bed dependant on care with fole

## 2018-10-13 NOTE — PROGRESS NOTES
Gastroenterology Follow-Up Note      Armando Prom Patient Status:  Inpatient    1940 MRN [id number]   [facility] 8NE-A Attending Raymundo Erickson MD   Hosp Day # 3 PCP PHYSICIAN NONSTAFF     Chief Complaint/Reason for Follow Paucity amount of gas in the colon. Findings raise suspicion for small bowel obstruction. No free air seen.       Assessment/Plan:    Elevated Liver Enzymes/Distended Gallbladder  -LFT's improving - likely due to urinary sepsis - cnt abx     Ileus  -no ob

## 2018-10-13 NOTE — PLAN OF CARE
Problem: DECISION MAKING  Goal: Pt/Family able to effectively weigh alternatives and participate in decision making related to treatment and care  INTERVENTIONS:  - Determine when there are differences between patient's view, family's view, and healthcare

## 2018-10-13 NOTE — PROGRESS NOTES
[address] [facility]  TEL: [phone]  FAX: [phone]    Jade Chirinos Patient Status:  Inpatient    1940 MRN [id number]   [facility] 4SW-A Attending Sampson Li MD   Hosp Day # 3 PCP PHYSICIAN NO reviewed    ASSESSMENT:     1. enterobacter sepsis-  origin in view of + Ucx with same and obstructive stone with hydronephrosis   -also with abnl GB per imaging but no RUQ abd pain. Seen by GI, HIDA pend     2. Enterobacter pyelonephritis     3.  Obstruc

## 2018-10-13 NOTE — PROGRESS NOTES
[facility] Cardiology Progress Note        Yordy Maher Patient Status:  Inpatient    1940 MRN [id number]   [facility] 4SW-A Attending Tyesha Duggan MD   Hosp Day # 3 PCP PHYSICIAN NONSTAFF     Subjective: 10.0*  10.8*  11.1*   PLT  316.0  170.0  144.0*  158.0       Chem:  Recent [employer]   Lab  10/10/18   0934  10/11/18   0441  10/12/18   0431  10/13/18   0457   NA  138  145*  144  143   K  3.6  3.7  3.7  3.6  3.6   CL  105  116*  115*  119*   CO2  19.0*  17.0*

## 2018-10-14 NOTE — PLAN OF CARE
CARDIOVASCULAR - ADULT    • Maintains optimal cardiac output and hemodynamic stability Not Progressing    • Absence of cardiac arrhythmias or at baseline Not Progressing        COPING    • Pt/Family able to verbalize concerns and demonstrate effective copi strict NPO d/t failed video swallow. Patient declining testing and interventions for nutrition. Palliative care to speak to family regarding patient future code status with change of events. Patient stable with HR stable in AFIb.  Cardizem gtt, ivf, and iv

## 2018-10-14 NOTE — PROGRESS NOTES
Dr Nery Duque called back quickly and orders rec'd. IVF changed out and 0.45 dc'd. No other orders at this time. No questions or concerns. Await recheck.

## 2018-10-14 NOTE — PROGRESS NOTES
Patient family at bedside and lots of dicussion took place to see whether patient was decided regarding Code status. Patient wanted to eat applesauce and drink diet coke and had expressed this to the family.   Instructed that pleasure fdgs could not take pl

## 2018-10-14 NOTE — PROGRESS NOTES
POCT Glucose Once [[id number]] (Abnormal) Collected: 10/14/18 1809   Updated: 10/14/18 1813    Specimen Type: Blood     POC Glucose 60 Abnormally low  mg/dL     IV D50 given slow over 3- 5  Minutes per protocol. Will recheck in 15 minutes.  Per Hypoglyemic

## 2018-10-14 NOTE — PHYSICAL THERAPY NOTE
Order received, chart reviewed and attempted to do PT evaluation. RN cleared pt however, after introduction, pt initially agreeable then when this writer mentioned the plan which is sitting EOB and standing, pt then declined.  Pt educated on benefits of get

## 2018-10-14 NOTE — PROGRESS NOTES
Left message with Dr Sinan Salgado about family being here in AM both POA's and spouse to return in AM 9331-1034 am.

## 2018-10-14 NOTE — PROGRESS NOTES
spo2 88 not sustained patient does go down but hands are cold. Patient is extremely dry d/t npo status. Will continue to monitor. sats 91 on tele with afib rates 90's.

## 2018-10-14 NOTE — PROGRESS NOTES
[address] [facility]  TEL: [phone]  FAX: [phone]    Lety Trejo Patient Status:  Inpatient    1940 MRN [id number]   [facility] 4SW-A Attending Lore Bradshaw MD   Hosp Day # 4 PCP PHYSICIAN NO Microbiology    Reviewed in EMR,     Radiology: reviewed    ASSESSMENT:     1. enterobacter sepsis-  origin in view of + Ucx with same and obstructive stone with hydronephrosis   -also with abnl GB per imaging but no RUQ abd pain.  Seen by ROSIE RICO

## 2018-10-14 NOTE — PROGRESS NOTES
[facility] Card[facility] Progress Note        Thora Better Patient Status:  Inpatient    1940 MRN [id number]   [facility] 4SW-A Attending Naeem Mendez MD   Hosp Day # 4 PCP PHYSICIAN NONSTAFF     Subjective: PLT  316.0  170.0  144.0*  158.0       Chem:  Recent Labs   Lab  10/10/18   0934  10/11/18   0441  10/12/18   0431  10/13/18   0457  10/14/18   0510   NA  138  145*  144  143   --    K  3.6  3.7  3.7  3.6  3.6  3.8   CL  105  116*  115*  119*   --    CO2

## 2018-10-14 NOTE — PLAN OF CARE
Problem: CARDIOVASCULAR - ADULT  Goal: Maintains optimal cardiac output and hemodynamic stability  INTERVENTIONS:  - Monitor vital signs, rhythm, and trends  - Monitor for bleeding, hypotension and signs of decreased cardiac output  - Evaluate effectivenes Follow urinary retention protocol/standard of care  - Consider collaborating with pharmacy to review patient's medication profile  - Implement strategies to promote bladder emptying  Outcome: Progressing      Problem: SKIN/TISSUE INTEGRITY - ADULT  Goal: S

## 2018-10-14 NOTE — PROGRESS NOTES
[facility]  Progress Note     Samra Mensah Patient Status:  Inpatient    1940 MRN [id number]   [facility] 4SW-A Attending Fernanda Hennessy MD   Hosp Day # 4 PCP PHYSICIAN NONSTAFF     Chief Complaint: sepsis    S: Patient c Estimated Creatinine Clearance: 31 mL/min (A) (based on SCr of 1.96 mg/dL (H)).     Recent Labs   Lab  10/11/18   0441  10/12/18   0431  10/13/18   0457   PTP  17.3*  18.2*  20.8*   INR  1.36*  1.45*  1.72*       Recent Labs   Lab  10/10/18   0934  10 palliative care MD.  Recommend comfort care as patient does not want feeding tube and Failed VFSS.  patient declined HIDA and reports he wants to go home. Palliative Care Conference on Monday.     Alvarez GERARD  8:40 AM     Addendum  Patient seen and e

## 2018-10-15 NOTE — PROGRESS NOTES
Assumed care of pt at 11:30. Pt's wife and children visiting with pt. Residential hospice RN met with family. Plan for pt to start with hospice. Dr Anna Cespedes wrote orders for hospice and medications.     Family called because \"something going on\" with

## 2018-10-15 NOTE — PLAN OF CARE
CARDIOVASCULAR - ADULT    • Absence of cardiac arrhythmias or at baseline Not Progressing        DEATH & DYING    • Pt/Family communicate acceptance of impending death and feel psychological comfort and peace Not Progressing        GENITOURINARY - ADULT 4/5. Patient states at home he transfers to a wheelchair and that is how he maneuvers around the house. Resp: Diminished. Supplemental O2. No cough. Cardiac: A-fib. Pedal pulses +1. No edema. GI: WNL    : Espino. [place] tinged urine.  Ureteral stent

## 2018-10-15 NOTE — PROGRESS NOTES
[facility]  Progress Note     Armando Prom Patient Status:  Inpatient    1940 MRN [id number]   [facility] 4SW-A Attending Yelitza Cardenas MD   Hosp Day # 5 PCP PHYSICIAN NONSTAFF     Chief Complaint: sepsis    S:   Has no 2.28*  1.96*   --   1.40*   GFRAA  30*  36*   --   54*   GFRNAA  26*  31*   --   47*   CA  8.4  8.2*   --   8.1*   ALB  1.9*  1.8*   --   1.6*   NA  144  143   --   148*   K  3.6  3.6  3.8  4.1   CL  115*  119*   --   120*   CO2  17.0*  19.0*   --   18.0* hydronephrosis, sepsis  1. Improving, recheck in am  12. CAD/ PVD / AAA   1. Hold ASA and coumadin  13. metabolic acidosis 2/2 sepsis/renal failure- monitor   14.  Thrombocytopenia- resolved    Quality:  · DVT Prophylaxis: SCDS  · CODE status: full   · Fole

## 2018-10-15 NOTE — PLAN OF CARE
PLAN OF CARE - SHIFT NOTE      Patient is ANOx4, on 4L NC (desat around 2000 on 2L), tele A Fib with occasional PVCs, thompson in place per MD, incontinent of bowel last BM 10/13, bedrest with refusal to turn or get out of bed.  Patient is currently on a cardi Progressing        DECISION MAKING    • Pt/Family able to effectively weigh alternatives and participate in decision making related to treatment and care Not Progressing        GENITOURINARY - ADULT    • Absence of urinary retention Not Progressing

## 2018-10-15 NOTE — PROGRESS NOTES
Repeat POC GLUCOSE   POCT GLUCOSE   Collected: 10/14/18 2256   Resulting lab: [facility]   Reference range: 65 - 99 mg/dL   Value: 81     Will continue to follow hypoglycemic protocol.

## 2018-10-15 NOTE — PROGRESS NOTES
[facility] Cardiology Progress Note        Marylene Roll Patient Status:  Inpatient    1940 MRN [id number]   [facility] 4SW-A Attending Alexsander Abdi MD   Hosp Day # 5 PCP PHYSICIAN NONSTAFF     Subjective: 11.4  15.5*   HGB  14.3  10.0*  10.8*  11.1*  13.1   PLT  316.0  170.0  144.0*  158.0  186. 0       Chem:  Recent Labs   Lab  10/10/18   3645  10/11/18   0441  10/12/18   0431  10/13/18   0457  10/14/18   0510  10/15/18   0504   NA  138  145*  144  143   --

## 2018-10-15 NOTE — DISCHARGE SUMMARY
[facility]  Discharge Summary    Nick Perez Patient Status:  Inpatient    1940 MRN [id number]   [facility] 8NE-A Attending No att. providers found   Hosp Day # 5 PCP PHYSICIAN NONSTAFF     Date of Admission: 10/10/2018 urinary tract infection and was started on antibiotics, he was apparently found to have a multidrug-resistant bacteria which is sensitive to ceftriaxone per his PCP Dr. Shavonne Holguin. He was advised to come to the ER for further evaluation.   He came today to the biliary tree dilatation or focal liver disease. No evidence of splenomegaly. Nonvisualization of the pancreas because of bowel gas. Moderate left-sided hydronephrosis.      Patient admitted in ICU on admission    Patient seen by multiple consulta MD on 10/15/2018 a.m. and decided on comfort care only .   Patient's wife and daughters including Erin Jeffrey met with palliative care conference with Dr. Lis Zavala and plan  was for inpatient hospice and family was planning to meet with hospice this afternoon

## 2018-10-15 NOTE — HOSPICE RN NOTE
Contacted patients daughter Irina Ferreira to set up time to meet to discuss hospice. Irina Ferreira informed me that patient had just passed away.

## 2018-11-30 NOTE — PROGRESS NOTES
Lilia 149 Patient Status:  Inpatient    1940 MRN [id number]   [facility] 4SW-A Attending Henna Ty MD   Hosp Day # 2 PCP PHYSICIAN NONSTAFF     Pulm / Critical Care Progress Note     S: Pt states that Recent Labs   Lab  10/10/18   1959  10/11/18   0441  10/12/18   0431   INR  1.31*  1.36*  1.45*         Recent Labs   Lab  10/10/18   0934  10/11/18   0441  10/12/18   0431   NA  138  145*  144   K  3.6  3.7  3.7  3.6   CL  105  116*  115*   CO2  19.0* [No Acute Distress] : no acute distress [Well Nourished] : well nourished [Well Developed] : well developed [Well-Appearing] : well-appearing [Normal Sclera/Conjunctiva] : normal sclera/conjunctiva [PERRL] : pupils equal round and reactive to light [EOMI] : extraocular movements intact [Normal Outer Ear/Nose] : the outer ears and nose were normal in appearance [Normal Oropharynx] : the oropharynx was normal [No JVD] : no jugular venous distention [Supple] : supple [No Lymphadenopathy] : no lymphadenopathy [Thyroid Normal, No Nodules] : the thyroid was normal and there were no nodules present [No Respiratory Distress] : no respiratory distress  [Clear to Auscultation] : lungs were clear to auscultation bilaterally [No Accessory Muscle Use] : no accessory muscle use [Normal Rate] : normal rate  [Regular Rhythm] : with a regular rhythm [Normal S1, S2] : normal S1 and S2 [No Murmur] : no murmur heard [No Carotid Bruits] : no carotid bruits [No Abdominal Bruit] : a ~M bruit was not heard ~T in the abdomen [No Varicosities] : no varicosities [Pedal Pulses Present] : the pedal pulses are present [No Edema] : there was no peripheral edema [No Extremity Clubbing/Cyanosis] : no extremity clubbing/cyanosis [No Palpable Aorta] : no palpable aorta [Soft] : abdomen soft [Non Tender] : non-tender [Non-distended] : non-distended [No Masses] : no abdominal mass palpated [No HSM] : no HSM [Normal Bowel Sounds] : normal bowel sounds [Normal Posterior Cervical Nodes] : no posterior cervical lymphadenopathy [Normal Anterior Cervical Nodes] : no anterior cervical lymphadenopathy [No CVA Tenderness] : no CVA  tenderness [No Spinal Tenderness] : no spinal tenderness [No Joint Swelling] : no joint swelling [Grossly Normal Strength/Tone] : grossly normal strength/tone [No Rash] : no rash [Normal Gait] : normal gait [Coordination Grossly Intact] : coordination grossly intact [No Focal Deficits] : no focal deficits [Deep Tendon Reflexes (DTR)] : deep tendon reflexes were 2+ and symmetric [Normal Affect] : the affect was normal [Normal Insight/Judgement] : insight and judgment were intact

## 2025-05-09 NOTE — CONSULTS
[id number] [facility] 149 Follow Up    Gayathri Gabriel Patient Status:  Inpatient    1940 MRN [id number]   [facility] 8NE-A Attending Lisa Jang MD   [facility] Day # 5 PCP PHYSICIAN NONSTAFF     Date of Consult: 10/15/2018  Pa 1 drop, 1 drop, Ophthalmic, QID PRN  •  diltiazem 100mg/100ml in NaCl (CARDIZEM) premix/add-vantage, 2.5-25 mg/hr, Intravenous, Continuous  •  atorvastatin (LIPITOR) tab 80 mg, 80 mg, Oral, Nightly  •  docusate sodium (COLACE) cap 100 mg, 100 mg, Oral, BID FLUORSCOPY TIME:  1 minute and 24 seconds RADIATION DOSE (AIR KERMA PRODUCT):  67.6 uGy/m2   FINDINGS:  PHARYNGEAL PHASE:  Normal for age. ASPIRATION:  There is gross aspiration upon administration of nectar thick barium.  PENETRATION:  Gross penetration an Sepsis, due to unspecified organism ([facility] [address]        Elevated lactic acid level        Intracranial hemorrhage (HCC)        Counseling regarding advanced care planning and goals of care      Palliative Performance Scale 30%    Emotion support provided to michael [employer]

## (undated) DEVICE — KENDALL SCD EXPRESS SLEEVES, KNEE LENGTH, MEDIUM: Brand: KENDALL SCD

## (undated) DEVICE — CYSTO CDS-LF: Brand: MEDLINE INDUSTRIES, INC.

## (undated) DEVICE — STERILE POLYISOPRENE POWDER-FREE SURGICAL GLOVES: Brand: PROTEXIS

## (undated) DEVICE — SOL  .9 3000ML

## (undated) DEVICE — Device

## (undated) DEVICE — SYRINGE 20CC LL TIP

## (undated) NOTE — LETTER
Maida Reeder [address]  [place], [address] [facility]    Consent for Operation  Date: __________________                                Time: _______________    1.  I authorize the performance upon Roxana Molina the following operation:  Proced procedure has been videotaped, the surgeon will obtain the original videotape. The hospital will not be responsible for storage or maintenance of this tape.   7. For the purpose of advancing medical education, I consent to the admittance of observers to the STATEMENTS REQUIRING INSERTION OR COMPLETION WERE FILLED IN.     Signature of Patient:   ___________________________    When the patient is a minor or mentally incompetent to give consent:  Signature of person authorized to consent for patient: ____________ supplements, and pills I can buy without a prescription (including street drugs/illegal medications). Failure to inform my anesthesiologist about these medicines may increase my risk of anesthetic complications. iv.  If I am allergic to anything or have ha Anesthesiologist Signature     Date   Time  I have discussed the procedure and information above with the patient (or patient’s representative) and answered their questions. The patient or their representative has agreed to have anesthesia services.     ___

## (undated) NOTE — LETTER
Maida Reeder [address]  [place], [address] [facility]    Consent for Operation  Date: __________________                                Time: _______________    1.  I authorize the performance upon Tha Kins the following operation:  Proced procedure has been videotaped, the surgeon will obtain the original videotape. The hospital will not be responsible for storage or maintenance of this tape.   7. For the purpose of advancing medical education, I consent to the admittance of observers to the STATEMENTS REQUIRING INSERTION OR COMPLETION WERE FILLED IN.     Signature of Patient:   ___________________________    When the patient is a minor or mentally incompetent to give consent:  Signature of person authorized to consent for patient: ____________ supplements, and pills I can buy without a prescription (including street drugs/illegal medications). Failure to inform my anesthesiologist about these medicines may increase my risk of anesthetic complications. iv.  If I am allergic to anything or have ha Anesthesiologist Signature     Date   Time  I have discussed the procedure and information above with the patient (or patient’s representative) and answered their questions. The patient or their representative has agreed to have anesthesia services.     ___